# Patient Record
Sex: FEMALE | Race: ASIAN | NOT HISPANIC OR LATINO
[De-identification: names, ages, dates, MRNs, and addresses within clinical notes are randomized per-mention and may not be internally consistent; named-entity substitution may affect disease eponyms.]

---

## 2022-09-19 ENCOUNTER — NON-APPOINTMENT (OUTPATIENT)
Age: 47
End: 2022-09-19

## 2022-09-19 ENCOUNTER — APPOINTMENT (OUTPATIENT)
Dept: ORTHOPEDIC SURGERY | Facility: CLINIC | Age: 47
End: 2022-09-19

## 2022-09-19 VITALS — WEIGHT: 135 LBS | HEIGHT: 63 IN | BODY MASS INDEX: 23.92 KG/M2

## 2022-09-19 PROBLEM — Z00.00 ENCOUNTER FOR PREVENTIVE HEALTH EXAMINATION: Status: ACTIVE | Noted: 2022-09-19

## 2022-09-19 PROCEDURE — 99204 OFFICE O/P NEW MOD 45 MIN: CPT | Mod: 25

## 2022-09-19 PROCEDURE — 20611 DRAIN/INJ JOINT/BURSA W/US: CPT

## 2022-09-19 RX ORDER — LEVOTHYROXINE SODIUM 0.17 MG/1
TABLET ORAL
Refills: 0 | Status: ACTIVE | COMMUNITY

## 2022-09-19 NOTE — PHYSICAL EXAM
[de-identified] : PHYSICAL EXAM LEFT  SHOULDER\par \par NORMAL POSTURE / SCAPULAR PROTRACTION\par AROM LEFT 120 / 120 / 70 / 10 \par RIGHT SHOULDER  150 \par TENDER: SA REGION POSTERIOR AND LATERAL \par \par SPECIAL TESTING :\par ALLEN - POSITIVE \par ZARA - POSITIVE \par SPEED TEST - POSITIVE\par \par ALVAREZ - NEGATIVE \par APPREHENSION AND SUPPRESSION - NEGATIVE \par \par RC STRENGTH TESTING \par SS:  5/5\par SUB 5/5\par IS     5/5\par BICEPS  5/5\par \par SENSATION  - GROSSLY INTACT\par \par \par  [de-identified] : LEFT SHOULDER XRAY (2 VIEWS - AP AND OUTLET) -  \par NO OBVIOUS FRACTURE , SEPARATION OR DISLOCATION \par NO SIGNIFICANT OSTEOARTHRITIS, \par TYPE 2B ACROMION \par CSA=36.1\par

## 2022-09-19 NOTE — DISCUSSION/SUMMARY
[de-identified] : ULTRASOUND EVALUATION  REVEALS INFLAMMATORY CHANGES WITHOUT SIGNIFICANT TEAR \par PATIENT HAS ELECTED TO PROCEED WITH KENALOG INJECTION SHOULDER \par RISKS AND BENEFITS DISCUSSED - VERBAL CONSENT OBTAINED \par SEE PROCEDURE NOTE\par \par \par POST INJECTION INSTRUCTIONS:\par \par INJECTION THERAPY HANDOUT PROVIDED\par \par COLD THERAPY , ANALGESICS PRN\par \par HOME  EXERCISES QD - TBAND2 \par \par START P.T.  WITHIN 2 WEEKS AFTER INJECTION - 2 X 4 WEEKS \par \par CONSIDER 2ND INJECTION \par \par MRI IF NO RELIEF 12 WEEKS\par

## 2022-09-19 NOTE — HISTORY OF PRESENT ILLNESS
[de-identified] : LEFT SHOULDER PAIN- RHD \par PAIN STARTED MAY 2022- NO SPECIFIC INJURY - CARRYING BACKPACK \par WAS INTERMITTANTLY BETTER WORSE THE BETTER \par PAIN LEVEL:1/10, CAN BE 6/10 \par SHOOTING \par RADIATES DOWN ARM \par BURNING SENSATION \par INTERMITTENT \par BETTER WITH ADVIL, RESTING\par WORSE WHEN REACHING  BACK, LATERAL MOVEMENT, REACHING \par

## 2022-09-19 NOTE — PROCEDURE
[de-identified] : DIAGNOSTIC ULTRASOUND LEFT SHOULDER\par \par DIAGNOSTIC SONOGRAPHY of the Rotator Cuff Soft Tissue of the LEFT  SHOULDER was performed in Multiple Scan Planes with varying transducer frequencies.\par Imaging of the Supraspinatus Tendon reveals TENDONITIS, BURSITIS, ARTICULAR SIDE DEGENERATION  WITH OUT SIGNIFICANT / COMPLETE TEAR\par Imaging of the Biceps Tendon reveals no significant tear.\par Imaging of the Subscapularis Tendon reveals no significant tear.\par Imaging of the Infraspinatus Tendon reveals no significant tear.\par Key images were save digitally and reviewed with patient.\par \par \par INJECTION LEFT SHOULDER SA SPACE\par \par Patient has demonstrated limited relief from NSAIDS, rest, exercises / PT, and after discussion of the risks and benefits, the patient has elected to proceed with an ULTRASOUND GUIDED injection into the LEFT SUBACROMIAL  SPACE LATERAL APPROACH \par  \par Confirmed that the patient does not have history of prior adverse reactions, active, infections, or relevant allergies. There was no effusion, erythema, or warmth, and the skin was clear\par \par The skin was sterilized with alcohol. Ethyl Chloride was used as a topical anesthetic. Routine sterile technique. \par The site was injected UTILIZING ULTRASOUND GUIDANCE to confirm appropriate placement of the needle-\par with a mixture of medication and local anesthetic. The injection was completed without complication and a bandage was applied.\par  \par The patient tolerated the procedure well and was given post-injection instructions.Rec: Cold therapy, analgesics, avoid heavy activity.\par MEDICATION: 4cc of 1% xylocaine + 40mg of KENALOG\par \par

## 2022-11-02 ENCOUNTER — APPOINTMENT (OUTPATIENT)
Dept: ORTHOPEDIC SURGERY | Facility: CLINIC | Age: 47
End: 2022-11-02

## 2022-11-02 DIAGNOSIS — M75.82 OTHER SHOULDER LESIONS, LEFT SHOULDER: ICD-10-CM

## 2022-11-02 DIAGNOSIS — M75.42 IMPINGEMENT SYNDROME OF LEFT SHOULDER: ICD-10-CM

## 2022-11-02 PROCEDURE — 99213 OFFICE O/P EST LOW 20 MIN: CPT

## 2022-11-02 NOTE — HISTORY OF PRESENT ILLNESS
[de-identified] : LEFT SHOULDER PAIN \par FOLLOW UP \par PAIN LEVEL: 3/10- SHARP\par SEPTEMBER 19, 2022- CORTISONE INJECTION-HELPFUL  \par PT IS DOING AT HOME EXERCISES-HELPFUL \par \par \par PREVIOUS HPI\par LEFT SHOULDER PAIN- RHD \par PAIN STARTED MAY 2022- NO SPECIFIC INJURY - CARRYING BACKPACK \par WAS INTERMITTANTLY BETTER WORSE THE BETTER \par PAIN LEVEL:1/10, CAN BE 6/10 \par SHOOTING \par RADIATES DOWN ARM \par BURNING SENSATION \par INTERMITTENT \par BETTER WITH ADVIL, RESTING\par WORSE WHEN REACHING BACK, LATERAL MOVEMENT, REACHING

## 2022-11-02 NOTE — DISCUSSION/SUMMARY
[de-identified] : \par PATIENT HAS DEFERRED 2ND KENALOG INJECTION \par HAS NOT YET TRIED P.T. \par \par START P.T.  - 2 X 4-6 WEEKS \par \par CONSIDER 2ND INJECTION \par \par MRI IF NO RELIEF 12 WEEKS\par

## 2022-11-02 NOTE — PHYSICAL EXAM
[de-identified] : PHYSICAL EXAM LEFT  SHOULDER\par \par NORMAL POSTURE \par AROM LEFT 120 / 120 / 80 / 10 \par RIGHT SHOULDER  150 \par TENDER: SA REGION POSTERIOR AND LATERAL \par \par SPECIAL TESTING :\par ALLEN - POSITIVE \par ZARA - POSITIVE \par SPEED TEST - POSITIVE\par \par ALVAREZ - NEGATIVE \par APPREHENSION AND SUPPRESSION - NEGATIVE \par \par RC STRENGTH TESTING \par SS:  5/5\par SUB 5/5\par IS     5/5\par BICEPS  5/5\par \par SENSATION  - GROSSLY INTACT\par \par \par